# Patient Record
Sex: FEMALE | Race: BLACK OR AFRICAN AMERICAN | NOT HISPANIC OR LATINO | Employment: OTHER | URBAN - METROPOLITAN AREA
[De-identification: names, ages, dates, MRNs, and addresses within clinical notes are randomized per-mention and may not be internally consistent; named-entity substitution may affect disease eponyms.]

---

## 2022-07-08 ENCOUNTER — TELEPHONE (OUTPATIENT)
Dept: ONCOLOGY | Facility: OTHER | Age: 52
End: 2022-07-08

## 2022-07-08 NOTE — TELEPHONE ENCOUNTER
PATIENT CALLED ABOUT SEEING ONE OF OUR DRS. FOR A SECOND OPINION, THEN PUT ME ON HOLD, HELD A GOOD WHILE & THEN HUNG UP.

## 2022-07-08 NOTE — TELEPHONE ENCOUNTER
PT CALLING TO UPDATE HER CHART INFO AND GET CONTACT INFO TO HAVE REF SENT - WILL LOOK INTO BOTH [facility] AND [facility] AND THEN MAKE HER DECISION.

## 2022-07-21 ENCOUNTER — CONSULT (OUTPATIENT)
Dept: ONCOLOGY | Facility: CLINIC | Age: 52
End: 2022-07-21

## 2022-07-21 ENCOUNTER — LAB (OUTPATIENT)
Dept: LAB | Facility: HOSPITAL | Age: 52
End: 2022-07-21

## 2022-07-21 VITALS
SYSTOLIC BLOOD PRESSURE: 129 MMHG | OXYGEN SATURATION: 95 % | HEIGHT: 65 IN | RESPIRATION RATE: 16 BRPM | DIASTOLIC BLOOD PRESSURE: 94 MMHG | TEMPERATURE: 97.3 F | BODY MASS INDEX: 21.16 KG/M2 | WEIGHT: 127 LBS | HEART RATE: 83 BPM

## 2022-07-21 DIAGNOSIS — C64.9 RENAL CELL CARCINOMA, UNSPECIFIED LATERALITY: Primary | ICD-10-CM

## 2022-07-21 DIAGNOSIS — R10.9 ABDOMINAL PAIN, UNSPECIFIED ABDOMINAL LOCATION: ICD-10-CM

## 2022-07-21 DIAGNOSIS — R79.89 ELEVATED LIVER FUNCTION TESTS: ICD-10-CM

## 2022-07-21 LAB
ALBUMIN SERPL-MCNC: 4 G/DL (ref 3.5–5.2)
ALBUMIN/GLOB SERPL: 1.1 G/DL (ref 1.1–2.4)
ALP SERPL-CCNC: 125 U/L (ref 38–116)
ALT SERPL W P-5'-P-CCNC: 44 U/L (ref 0–33)
ANION GAP SERPL CALCULATED.3IONS-SCNC: 12.9 MMOL/L (ref 5–15)
AST SERPL-CCNC: 34 U/L (ref 0–32)
BASOPHILS # BLD AUTO: 0.02 10*3/MM3 (ref 0–0.2)
BASOPHILS NFR BLD AUTO: 0.4 % (ref 0–1.5)
BILIRUB SERPL-MCNC: 0.2 MG/DL (ref 0.2–1.2)
BUN SERPL-MCNC: 14 MG/DL (ref 6–20)
BUN/CREAT SERPL: 11.5 (ref 7.3–30)
CALCIUM SPEC-SCNC: 9.2 MG/DL (ref 8.5–10.2)
CHLORIDE SERPL-SCNC: 102 MMOL/L (ref 98–107)
CO2 SERPL-SCNC: 26.1 MMOL/L (ref 22–29)
CREAT SERPL-MCNC: 1.22 MG/DL (ref 0.6–1.1)
DEPRECATED RDW RBC AUTO: 43.5 FL (ref 37–54)
EGFRCR SERPLBLD CKD-EPI 2021: 53.8 ML/MIN/1.73
EOSINOPHIL # BLD AUTO: 0.06 10*3/MM3 (ref 0–0.4)
EOSINOPHIL NFR BLD AUTO: 1.3 % (ref 0.3–6.2)
ERYTHROCYTE [DISTWIDTH] IN BLOOD BY AUTOMATED COUNT: 14 % (ref 12.3–15.4)
GLOBULIN UR ELPH-MCNC: 3.7 GM/DL (ref 1.8–3.5)
GLUCOSE SERPL-MCNC: 117 MG/DL (ref 74–124)
HCT VFR BLD AUTO: 36.8 % (ref 34–46.6)
HGB BLD-MCNC: 11.9 G/DL (ref 12–15.9)
IMM GRANULOCYTES # BLD AUTO: 0.1 10*3/MM3 (ref 0–0.05)
IMM GRANULOCYTES NFR BLD AUTO: 2.1 % (ref 0–0.5)
LYMPHOCYTES # BLD AUTO: 1.56 10*3/MM3 (ref 0.7–3.1)
LYMPHOCYTES NFR BLD AUTO: 33.2 % (ref 19.6–45.3)
MCH RBC QN AUTO: 27.5 PG (ref 26.6–33)
MCHC RBC AUTO-ENTMCNC: 32.3 G/DL (ref 31.5–35.7)
MCV RBC AUTO: 85 FL (ref 79–97)
MONOCYTES # BLD AUTO: 0.29 10*3/MM3 (ref 0.1–0.9)
MONOCYTES NFR BLD AUTO: 6.2 % (ref 5–12)
NEUTROPHILS NFR BLD AUTO: 2.67 10*3/MM3 (ref 1.7–7)
NEUTROPHILS NFR BLD AUTO: 56.8 % (ref 42.7–76)
NRBC BLD AUTO-RTO: 0 /100 WBC (ref 0–0.2)
PLATELET # BLD AUTO: 169 10*3/MM3 (ref 140–450)
PMV BLD AUTO: 10.3 FL (ref 6–12)
POTASSIUM SERPL-SCNC: 4.1 MMOL/L (ref 3.5–4.7)
PROT SERPL-MCNC: 7.7 G/DL (ref 6.3–8)
RBC # BLD AUTO: 4.33 10*6/MM3 (ref 3.77–5.28)
SODIUM SERPL-SCNC: 141 MMOL/L (ref 134–145)
WBC NRBC COR # BLD: 4.7 10*3/MM3 (ref 3.4–10.8)

## 2022-07-21 PROCEDURE — 99245 OFF/OP CONSLTJ NEW/EST HI 55: CPT | Performed by: INTERNAL MEDICINE

## 2022-07-21 PROCEDURE — 36415 COLL VENOUS BLD VENIPUNCTURE: CPT

## 2022-07-21 PROCEDURE — 85025 COMPLETE CBC W/AUTO DIFF WBC: CPT

## 2022-07-21 PROCEDURE — 80053 COMPREHEN METABOLIC PANEL: CPT | Performed by: INTERNAL MEDICINE

## 2022-07-21 RX ORDER — NALOXONE HYDROCHLORIDE 4 MG/.1ML
SPRAY NASAL
COMMUNITY
Start: 2021-11-08

## 2022-07-21 RX ORDER — OXYBUTYNIN CHLORIDE 15 MG/1
TABLET, EXTENDED RELEASE ORAL
COMMUNITY

## 2022-07-21 RX ORDER — CALCIUM POLYCARBOPHIL 625 MG
TABLET ORAL
COMMUNITY

## 2022-07-21 RX ORDER — POLYETHYLENE GLYCOL 3350 17 G/17G
POWDER, FOR SOLUTION ORAL
COMMUNITY

## 2022-07-21 RX ORDER — AMLODIPINE BESYLATE 5 MG/1
TABLET ORAL
COMMUNITY
Start: 2022-06-08

## 2022-07-21 RX ORDER — PANTOPRAZOLE SODIUM 40 MG/1
TABLET, DELAYED RELEASE ORAL
COMMUNITY
Start: 2022-02-28

## 2022-07-21 RX ORDER — ESTRADIOL 10 UG/1
INSERT VAGINAL
COMMUNITY

## 2022-07-21 RX ORDER — OXYCODONE HYDROCHLORIDE 10 MG/1
TABLET ORAL
COMMUNITY
Start: 2022-07-05

## 2022-07-21 RX ORDER — ONDANSETRON 8 MG/1
TABLET, ORALLY DISINTEGRATING ORAL
COMMUNITY

## 2022-07-21 RX ORDER — ALBUTEROL SULFATE 90 UG/1
AEROSOL, METERED RESPIRATORY (INHALATION)
COMMUNITY

## 2022-07-21 RX ORDER — LANOLIN ALCOHOL/MO/W.PET/CERES
CREAM (GRAM) TOPICAL
COMMUNITY

## 2022-07-21 NOTE — PROGRESS NOTES
Orders entered for CT chest without contrast, MRI abdomen with and without contrast to evaluate liver and pancreas, bone scan V.O. Dr. Arnett

## 2022-07-24 PROBLEM — R10.9 ABDOMINAL PAIN: Status: ACTIVE | Noted: 2022-07-24

## 2022-07-24 PROBLEM — C64.9 RENAL CELL CARCINOMA: Status: ACTIVE | Noted: 2022-07-24

## 2022-07-24 PROBLEM — R79.89 ELEVATED LIVER FUNCTION TESTS: Status: ACTIVE | Noted: 2022-07-24

## 2022-07-28 ENCOUNTER — APPOINTMENT (OUTPATIENT)
Dept: CT IMAGING | Facility: HOSPITAL | Age: 52
End: 2022-07-28

## 2022-07-29 ENCOUNTER — APPOINTMENT (OUTPATIENT)
Dept: NUCLEAR MEDICINE | Facility: HOSPITAL | Age: 52
End: 2022-07-29

## 2022-07-29 ENCOUNTER — APPOINTMENT (OUTPATIENT)
Dept: MRI IMAGING | Facility: HOSPITAL | Age: 52
End: 2022-07-29

## 2022-08-02 ENCOUNTER — TELEPHONE (OUTPATIENT)
Dept: ONCOLOGY | Facility: CLINIC | Age: 52
End: 2022-08-02

## 2022-08-02 NOTE — TELEPHONE ENCOUNTER
Call back to Ms. Disha and she is requesting to be sent to doctor closer to her in [place].  Let her know this RN will review with Dr. Arnett and let her know.

## 2022-08-02 NOTE — TELEPHONE ENCOUNTER
Caller: Patricia Gauthier    Relationship: Self    Best call back number: [phone]    Who are you requesting to speak with (clinical staff, provider,  specific staff member): CLINICAL    What was the call regarding: PATIENT REQUESTING INTERNAL TRANSFER TO [place] OFFICE FOR MD. [place]     Do you require a callback: YES

## 2022-08-02 NOTE — TELEPHONE ENCOUNTER
Caller: Patricia Gauthier    Relationship: Self    Best call back number: [phone]    What was the call regarding: PATRICIA CALLED TO SEE IF SOMEONE COULD CALL HER BACK TO DISCUSS REFERRING HER TO A DR IN [place] SO SHE IS CLOSER.    Do you require a callback: YES

## 2023-11-15 ENCOUNTER — HOSPITAL ENCOUNTER (OUTPATIENT)
Dept: HOSPITAL 22 - INF | Age: 53
Discharge: HOME | End: 2023-11-15
Payer: MEDICARE

## 2023-11-15 VITALS — BODY MASS INDEX: 21.7 KG/M2

## 2023-11-15 DIAGNOSIS — C64.9: Primary | ICD-10-CM

## 2023-11-15 LAB
ALBUMIN LEVEL: 3.6 G/DL (ref 3.5–5)
ALBUMIN/GLOB SERPL: 1 {RATIO} (ref 1.1–1.8)
ALP ISO SERPL-ACNC: 171 U/L (ref 38–126)
ALT SERPLBLD-CCNC: 30 U/L (ref 12–78)
ANION GAP SERPL CALC-SCNC: 14 MEQ/L (ref 5–15)
AST SERPL QL: 33 U/L (ref 14–36)
BILIRUBIN,TOTAL: 0.3 MG/DL (ref 0.2–1.3)
BUN SERPL-MCNC: 14 MG/DL (ref 7–17)
CALCIUM SPEC-MCNC: 9.2 MG/DL (ref 8.4–10.2)
CELLS COUNTED: 100
CHLORIDE SPEC-SCNC: 94 MMOL/L (ref 98–107)
CO2 SERPL-SCNC: 30 MMOL/L (ref 22–30)
CREAT BLD-SCNC: 0.9 MG/DL (ref 0.52–1.04)
CREATININE CLEARANCE ESTIMATED: 71 ML/MIN (ref 50–200)
DOHLE BOD BLD QL SMEAR: (no result)
ESTIMATED GLOMERULAR FILT RATE: 66 ML/MIN (ref 60–?)
GFR (AFRICAN AMERICAN): 80 ML/MIN (ref 60–?)
GLOBULIN SER CALC-MCNC: 3.7 G/DL (ref 1.3–3.2)
GLUCOSE: 122 MG/DL (ref 74–100)
HCT VFR BLD CALC: 37.2 % (ref 37–47)
HGB BLD-MCNC: 12.1 G/DL (ref 12.2–16.2)
MANUAL DIFFERENTIAL: (no result)
MCHC RBC-ENTMCNC: 32.6 G/DL (ref 31.8–35.4)
MCV RBC: 83.2 FL (ref 81–99)
MEAN CORPUSCULAR HEMOGLOBIN: 27.1 PG (ref 27–31.2)
PLATELET # BLD: 454 K/MM3 (ref 142–424)
POTASSIUM: 4 MMOL/L (ref 3.5–5.1)
PROT SERPL-MCNC: 7.3 G/DL (ref 6.3–8.2)
RBC # BLD AUTO: 4.47 M/MM3 (ref 4.2–5.4)
SODIUM SPEC-SCNC: 134 MMOL/L (ref 136–145)
WBC # BLD AUTO: 10 K/MM3 (ref 4.8–10.8)

## 2023-11-15 PROCEDURE — 80053 COMPREHEN METABOLIC PANEL: CPT

## 2023-11-15 PROCEDURE — 85025 COMPLETE CBC W/AUTO DIFF WBC: CPT

## 2023-11-15 PROCEDURE — 85007 BL SMEAR W/DIFF WBC COUNT: CPT

## 2023-11-15 PROCEDURE — 36415 COLL VENOUS BLD VENIPUNCTURE: CPT

## 2023-11-20 ENCOUNTER — HOSPITAL ENCOUNTER (OUTPATIENT)
Age: 53
End: 2023-11-20
Payer: MEDICARE

## 2023-11-20 VITALS — BODY MASS INDEX: 21.7 KG/M2

## 2023-11-20 DIAGNOSIS — C64.9: Primary | ICD-10-CM

## 2023-11-20 LAB
ALBUMIN LEVEL: 2.8 G/DL (ref 3.5–5)
ALBUMIN/GLOB SERPL: 1 {RATIO} (ref 1.1–1.8)
ALP ISO SERPL-ACNC: 105 U/L (ref 38–126)
ALT SERPLBLD-CCNC: 18 U/L (ref 12–78)
ANION GAP SERPL CALC-SCNC: 7.1 MEQ/L (ref 5–15)
AST SERPL QL: 22 U/L (ref 14–36)
BILIRUBIN,TOTAL: < 0.1 MG/DL (ref 0.2–1.3)
BUN SERPL-MCNC: 12 MG/DL (ref 7–17)
CALCIUM SPEC-MCNC: 8.4 MG/DL (ref 8.4–10.2)
CHLORIDE SPEC-SCNC: 102 MMOL/L (ref 98–107)
CO2 SERPL-SCNC: 29 MMOL/L (ref 22–30)
CREAT BLD-SCNC: 0.7 MG/DL (ref 0.52–1.04)
CREATININE CLEARANCE ESTIMATED: 91 ML/MIN (ref 50–200)
ESTIMATED GLOMERULAR FILT RATE: 88 ML/MIN (ref 60–?)
GFR (AFRICAN AMERICAN): 106 ML/MIN (ref 60–?)
GLOBULIN SER CALC-MCNC: 2.9 G/DL (ref 1.3–3.2)
GLUCOSE: 87 MG/DL (ref 74–100)
HCT VFR BLD CALC: 32.8 % (ref 37–47)
HGB BLD-MCNC: 10.3 G/DL (ref 12.2–16.2)
MCHC RBC-ENTMCNC: 31.4 G/DL (ref 31.8–35.4)
MCV RBC: 84.2 FL (ref 81–99)
MEAN CORPUSCULAR HEMOGLOBIN: 26.5 PG (ref 27–31.2)
PLATELET # BLD: 275 K/MM3 (ref 142–424)
POTASSIUM: 3.1 MMOL/L (ref 3.5–5.1)
PROT SERPL-MCNC: 5.7 G/DL (ref 6.3–8.2)
RBC # BLD AUTO: 3.89 M/MM3 (ref 4.2–5.4)
SODIUM SPEC-SCNC: 135 MMOL/L (ref 136–145)
WBC # BLD AUTO: 6.6 K/MM3 (ref 4.8–10.8)

## 2023-11-20 PROCEDURE — 74178 CT ABD&PLV WO CNTR FLWD CNTR: CPT

## 2023-11-20 PROCEDURE — 78306 BONE IMAGING WHOLE BODY: CPT

## 2023-11-20 PROCEDURE — A9503 TC99M MEDRONATE: HCPCS

## 2023-11-20 PROCEDURE — 80053 COMPREHEN METABOLIC PANEL: CPT

## 2023-11-20 PROCEDURE — 71270 CT THORAX DX C-/C+: CPT

## 2023-11-20 PROCEDURE — 85025 COMPLETE CBC W/AUTO DIFF WBC: CPT

## 2024-01-04 ENCOUNTER — HOSPITAL ENCOUNTER (OUTPATIENT)
Dept: HOSPITAL 22 - INF | Age: 54
Discharge: HOME | End: 2024-01-04
Payer: MEDICARE

## 2024-01-04 VITALS — BODY MASS INDEX: 21.7 KG/M2

## 2024-01-04 DIAGNOSIS — C64.9: Primary | ICD-10-CM

## 2024-01-04 LAB
COLOR UR: YELLOW
MICRO URNS: (no result)
PH UR: 6.5 [PH] (ref 5–8.5)
PROT UR STRIP-MCNC: (no result) MG/DL
RBC #/AREA URNS HPF: (no result) #/HPF (ref 0–3)
SP GR UR: 1.02 (ref 1–1.03)
SQUAMOUS URNS QL MICRO: (no result) #/HPF (ref 0–5)
UROBILINOGEN UR QL: 0.2 EU/DL

## 2024-01-04 PROCEDURE — G0463 HOSPITAL OUTPT CLINIC VISIT: HCPCS

## 2024-01-04 PROCEDURE — 81001 URINALYSIS AUTO W/SCOPE: CPT
